# Patient Record
Sex: FEMALE | NOT HISPANIC OR LATINO | URBAN - METROPOLITAN AREA
[De-identification: names, ages, dates, MRNs, and addresses within clinical notes are randomized per-mention and may not be internally consistent; named-entity substitution may affect disease eponyms.]

---

## 2023-07-31 ENCOUNTER — HOSPITAL ENCOUNTER (OUTPATIENT)
Dept: DATA CONVERSION | Facility: HOSPITAL | Age: 73
End: 2023-07-31

## 2023-08-14 ENCOUNTER — HOSPITAL ENCOUNTER (OUTPATIENT)
Dept: DATA CONVERSION | Facility: HOSPITAL | Age: 73
Discharge: HOME | End: 2023-08-14

## 2023-08-14 DIAGNOSIS — M75.101 UNSPECIFIED ROTATOR CUFF TEAR OR RUPTURE OF RIGHT SHOULDER, NOT SPECIFIED AS TRAUMATIC: ICD-10-CM

## 2023-09-23 PROBLEM — M75.121 COMPLETE TEAR OF RIGHT ROTATOR CUFF: Status: ACTIVE | Noted: 2023-09-23

## 2023-09-23 PROBLEM — M75.101 TEAR OF RIGHT ROTATOR CUFF: Status: ACTIVE | Noted: 2023-09-23

## 2023-09-23 PROBLEM — M19.011 ARTHRITIS OF RIGHT ACROMIOCLAVICULAR JOINT: Status: ACTIVE | Noted: 2023-09-23

## 2023-09-23 RX ORDER — METOPROLOL SUCCINATE 25 MG/1
25 TABLET, EXTENDED RELEASE ORAL
COMMUNITY
Start: 2013-05-03

## 2023-09-23 RX ORDER — LEVOTHYROXINE SODIUM 88 UG/1
88 TABLET ORAL
COMMUNITY

## 2023-09-23 RX ORDER — ALCOHOL 2.38 KG/3.79L
GEL TOPICAL
COMMUNITY
Start: 2013-12-12

## 2023-09-23 RX ORDER — SIMVASTATIN 40 MG/1
TABLET, FILM COATED ORAL
COMMUNITY
Start: 2013-05-03

## 2023-09-23 RX ORDER — ASPIRIN 81 MG/1
TABLET ORAL
COMMUNITY
Start: 2013-05-03

## 2023-09-23 RX ORDER — LOSARTAN POTASSIUM 25 MG/1
TABLET ORAL
COMMUNITY

## 2023-10-23 ENCOUNTER — APPOINTMENT (OUTPATIENT)
Dept: ORTHOPEDIC SURGERY | Facility: CLINIC | Age: 73
End: 2023-10-23
Payer: MEDICARE

## 2023-11-13 ENCOUNTER — OFFICE VISIT (OUTPATIENT)
Dept: ORTHOPEDIC SURGERY | Facility: CLINIC | Age: 73
End: 2023-11-13
Payer: MEDICARE

## 2023-11-13 DIAGNOSIS — M75.101 TEAR OF RIGHT ROTATOR CUFF, UNSPECIFIED TEAR EXTENT, UNSPECIFIED WHETHER TRAUMATIC: Primary | ICD-10-CM

## 2023-11-13 PROCEDURE — 1036F TOBACCO NON-USER: CPT | Performed by: ORTHOPAEDIC SURGERY

## 2023-11-13 PROCEDURE — 1125F AMNT PAIN NOTED PAIN PRSNT: CPT | Performed by: ORTHOPAEDIC SURGERY

## 2023-11-13 PROCEDURE — 1160F RVW MEDS BY RX/DR IN RCRD: CPT | Performed by: ORTHOPAEDIC SURGERY

## 2023-11-13 PROCEDURE — 1159F MED LIST DOCD IN RCRD: CPT | Performed by: ORTHOPAEDIC SURGERY

## 2023-11-13 PROCEDURE — 99213 OFFICE O/P EST LOW 20 MIN: CPT | Performed by: ORTHOPAEDIC SURGERY

## 2023-11-13 ASSESSMENT — PAIN SCALES - GENERAL: PAINLEVEL_OUTOF10: 1

## 2023-11-13 ASSESSMENT — PAIN - FUNCTIONAL ASSESSMENT: PAIN_FUNCTIONAL_ASSESSMENT: 0-10

## 2023-11-14 ASSESSMENT — ENCOUNTER SYMPTOMS
CHILLS: 1
EYE DISCHARGE: 0
WHEEZING: 0
JOINT SWELLING: 1
FEVER: 0
COLOR CHANGE: 0
TROUBLE SWALLOWING: 0
SHORTNESS OF BREATH: 0

## 2023-11-14 NOTE — PROGRESS NOTES
Reason for Appointment  Mild R shoulder pain     History of Present Illness  Patient is a 73 y.o. female here today for follow-up evaluation of mild right shoulder pain.  She has only had mild right shoulder pain for the last few months.  She is back to doing most of her regular activities with no issues.  The arm does fatigue weekly and she has some mild pain with certain activities.  Previous MRI has shown a full-thickness tear of the rotator cuff with moderate atrophy and biceps tendinosis.  She is active, no other recent injuries or falls.  No other changes in her past medical history, allergies, or medications.    History reviewed. No pertinent past medical history.    History reviewed. No pertinent surgical history.    Medication Documentation Review Audit       Reviewed by Florina Castaneda PA-C (Physician Assistant) on 11/14/23 at 0830      Medication Order Taking? Sig Documenting Provider Last Dose Status   aspirin 81 mg EC tablet [id number]  Take by mouth. [facility] Aspirin Low Dose 81 MG Oral Tablet Delayed Release Historical Provider, MD  Active   [id number]-algal oil (Metanx, algal oil,) 3 mg-35 mg-2 mg -90.314 mg capsule [id number]  Take by mouth.  Metanx 3-90.314-2-35 MG Oral Capsule Historical Provider, MD  Active   levothyroxine (Synthroid, Levoxyl) 88 mcg tablet [id number]  Take 1 tablet (88 mcg) by mouth.  Levothyroxine Sodium 88 MCG Oral Tablet Historical Provider, MD  Active   losartan (Cozaar) 25 mg tablet [id number]  Losartan Potassium Historical Provider, MD  Active   metoprolol succinate XL (Toprol-XL) 25 mg 24 hr tablet [id number]  Take 1 tablet (25 mg) by mouth.  Metoprolol Succinate ER 25 MG Oral Tablet Extended Release 24 Hour Historical Provider, MD  Active   rifAXIMin (Xifaxan) 550 mg tablet [id number]  Take by mouth.  Xifaxan 550 MG Oral Tablet Historical Provider, MD  Active   simvastatin (Zocor) 40 mg tablet [id number]  Take by mouth.  Simvastatin 40 MG Oral Tablet Historical Provider, MD   Active                    No Known Allergies    Review of Systems   Constitutional:  Positive for chills. Negative for fever.   HENT:  Negative for mouth sores and trouble swallowing.    Eyes:  Negative for discharge.   Respiratory:  Negative for shortness of breath and wheezing.    Cardiovascular:  Negative for chest pain.   Musculoskeletal:  Positive for joint swelling.   Skin:  Negative for color change and pallor.   All other systems reviewed and are negative.    Exam   On exam the right shoulder shows good active forward flexion up to 130 degrees.  She has good cuff strength with resisted external rotation and minimally positive impingement signs today.  Deltoid is functional.  Good pulses and sensation in the upper extremity    Assessment   Right shoulder rotator cuff tear    Plan   We had a long discussion with her today about her options.  At this point, she is functioning well, and there is no rush for any surgery at this point.  We did discuss a possible injection in the future for increased pain and a possible reverse shoulder replacement in the future.  She can follow-up with us as needed    Written by Florina Castaneda PA-C